# Patient Record
Sex: MALE | Race: WHITE | NOT HISPANIC OR LATINO | URBAN - METROPOLITAN AREA
[De-identification: names, ages, dates, MRNs, and addresses within clinical notes are randomized per-mention and may not be internally consistent; named-entity substitution may affect disease eponyms.]

---

## 2020-08-26 ENCOUNTER — TELEPHONE ENCOUNTER (OUTPATIENT)
Dept: URBAN - METROPOLITAN AREA CLINIC 40 | Facility: CLINIC | Age: 85
End: 2020-08-26

## 2020-08-26 RX ORDER — PANTOPRAZOLE SODIUM 40 MG/1
TAKE 1 TABLET BY ORAL ROUTE DAILY FOR 90 DAYS TABLET, DELAYED RELEASE ORAL 1
Qty: 90 | Refills: 3 | Status: ACTIVE | COMMUNITY
Start: 2020-05-08 | End: 2021-05-03

## 2020-08-26 RX ORDER — SUCRALFATE 1 G/1
1 TABLET ON AN EMPTY STOMACH TABLET ORAL TWICE A DAY
Qty: 60 TABLET | Refills: 2 | OUTPATIENT
Start: 2020-08-27 | End: 2020-11-24

## 2020-08-26 RX ORDER — SUCRALFATE 1 G/1
TAKE 1 TABLET BY ORAL ROUTE 2 TIMES A DAY FOR 90 DAYS TABLET ORAL 2
Qty: 180 | Refills: 1 | Status: ACTIVE | COMMUNITY
Start: 2020-02-28 | End: 2020-08-26

## 2020-11-22 ENCOUNTER — ERX REFILL RESPONSE (OUTPATIENT)
Dept: URBAN - METROPOLITAN AREA CLINIC 40 | Facility: CLINIC | Age: 85
End: 2020-11-22

## 2020-11-22 RX ORDER — SUCRALFATE 1 G/1
1 TABLET ON AN EMPTY STOMACH TABLET ORAL TWICE A DAY
Qty: 60 | Refills: 0

## 2020-12-24 ENCOUNTER — ERX REFILL RESPONSE (OUTPATIENT)
Dept: URBAN - METROPOLITAN AREA CLINIC 40 | Facility: CLINIC | Age: 85
End: 2020-12-24

## 2020-12-24 RX ORDER — SUCRALFATE 1 G/1
1 TABLET ON AN EMPTY STOMACH TABLET ORAL TWICE A DAY
Qty: 60 | Refills: 0

## 2021-01-28 ENCOUNTER — TELEPHONE ENCOUNTER (OUTPATIENT)
Dept: URBAN - METROPOLITAN AREA CLINIC 40 | Facility: CLINIC | Age: 86
End: 2021-01-28

## 2021-01-28 RX ORDER — SUCRALFATE 1 G/1
1 TABLET ON AN EMPTY STOMACH TABLET ORAL TWICE A DAY
Qty: 60 TABLET | Refills: 3

## 2021-02-02 ENCOUNTER — TELEPHONE ENCOUNTER (OUTPATIENT)
Dept: URBAN - METROPOLITAN AREA CLINIC 40 | Facility: CLINIC | Age: 86
End: 2021-02-02

## 2021-05-18 ENCOUNTER — ERX REFILL RESPONSE (OUTPATIENT)
Dept: URBAN - METROPOLITAN AREA CLINIC 13 | Facility: CLINIC | Age: 86
End: 2021-05-18

## 2021-05-18 RX ORDER — SUCRALFATE 1 G/1
TAKE 1 TABLET BY MOUTH TWICE DAILY ON AN EMPTY STOMACH TABLET ORAL
Qty: 60 | Refills: 0

## 2021-05-26 ENCOUNTER — TELEPHONE ENCOUNTER (OUTPATIENT)
Dept: URBAN - METROPOLITAN AREA CLINIC 40 | Facility: CLINIC | Age: 86
End: 2021-05-26

## 2021-05-26 RX ORDER — PANTOPRAZOLE SODIUM 40 MG/1
TAKE 1 TABLET BY ORAL ROUTE DAILY FOR 90 DAYS TABLET, DELAYED RELEASE ORAL 1
Qty: 90 | Refills: 0
Start: 2020-05-08

## 2021-06-15 ENCOUNTER — OFFICE VISIT (OUTPATIENT)
Dept: URBAN - METROPOLITAN AREA CLINIC 40 | Facility: CLINIC | Age: 86
End: 2021-06-15

## 2021-06-19 ENCOUNTER — ERX REFILL RESPONSE (OUTPATIENT)
Dept: URBAN - METROPOLITAN AREA CLINIC 13 | Facility: CLINIC | Age: 86
End: 2021-06-19

## 2021-06-19 RX ORDER — SUCRALFATE 1 G/1
TAKE 1 TABLET BY MOUTH TWICE DAILY ON AN EMPTY STOMACH TABLET ORAL
Qty: 60 | Refills: 0

## 2021-07-08 ENCOUNTER — OFFICE VISIT (OUTPATIENT)
Dept: URBAN - METROPOLITAN AREA CLINIC 40 | Facility: CLINIC | Age: 86
End: 2021-07-08
Payer: MEDICARE

## 2021-07-08 DIAGNOSIS — D50.0 IRON DEFICIENCY ANEMIA DUE TO CHRONIC BLOOD LOSS: ICD-10-CM

## 2021-07-08 DIAGNOSIS — K25.7 CHRONIC GASTRIC ULCER WITHOUT HEMORRHAGE AND WITHOUT PERFORATION: ICD-10-CM

## 2021-07-08 PROCEDURE — 99213 OFFICE O/P EST LOW 20 MIN: CPT | Performed by: INTERNAL MEDICINE

## 2021-07-08 RX ORDER — SUCRALFATE 1 G/1
TAKE 1 TABLET BY MOUTH TWICE DAILY ON AN EMPTY STOMACH TABLET ORAL
Qty: 60 | Refills: 0 | Status: ACTIVE | COMMUNITY

## 2021-07-08 RX ORDER — PANTOPRAZOLE SODIUM 40 MG/1
TAKE 1 TABLET BY ORAL ROUTE DAILY FOR 90 DAYS TABLET, DELAYED RELEASE ORAL 1
Qty: 90 | Refills: 0 | Status: ACTIVE | COMMUNITY
Start: 2020-05-08

## 2021-07-08 RX ORDER — SUCRALFATE 1 G/1
1 TABLET ON AN EMPTY STOMACH TABLET ORAL TWICE A DAY
Qty: 60 TABLET | Refills: 3 | Status: ACTIVE | COMMUNITY

## 2021-07-08 NOTE — HPI-TODAY'S VISIT:
The patient is a 87 year old male, who presents for follow up appointment. EGD was done approximately 1 year ago. The EGD findings revealed gastritis, esophagitis, a gastric ulcer, and schatzki's ring. T he patient pathology is consistent with gastritis and. CT shows pulmonary fibrosis/benign pancreatic cyst. feels better. gaining more weight. Had melena last year. was noted to have gastric ulcer/gastritis then. on carafate/PPI. no more bleeding. back on xarelto. has DM/HTN. no shortness of breath. has dementia

## 2021-07-08 NOTE — PHYSICAL EXAM CONSTITUTIONAL:
well developed, well nourished , in no acute distress , ambulating without difficulty ,decreased communication ability

## 2021-08-22 ENCOUNTER — ERX REFILL RESPONSE (OUTPATIENT)
Dept: URBAN - METROPOLITAN AREA CLINIC 40 | Facility: CLINIC | Age: 86
End: 2021-08-22

## 2021-08-22 RX ORDER — PANTOPRAZOLE SODIUM 40 MG/1
TAKE 1 TABLET BY ORAL ROUTE DAILY FOR 90 DAYS TABLET, DELAYED RELEASE ORAL 1
Qty: 90 | Refills: 0 | OUTPATIENT

## 2021-08-22 RX ORDER — PANTOPRAZOLE SODIUM 40 MG/1
TAKE 1 TABLET BY MOUTH DAILY TABLET, DELAYED RELEASE ORAL
Qty: 90 TABLET | Refills: 1 | OUTPATIENT

## 2021-09-14 ENCOUNTER — ERX REFILL RESPONSE (OUTPATIENT)
Dept: URBAN - METROPOLITAN AREA CLINIC 40 | Facility: CLINIC | Age: 86
End: 2021-09-14

## 2021-09-14 RX ORDER — PANTOPRAZOLE SODIUM 40 MG/1
TAKE 1 TABLET BY MOUTH DAILY TABLET, DELAYED RELEASE ORAL
Qty: 90 TABLET | Refills: 1 | OUTPATIENT

## 2021-12-23 ENCOUNTER — ERX REFILL RESPONSE (OUTPATIENT)
Dept: URBAN - METROPOLITAN AREA CLINIC 40 | Facility: CLINIC | Age: 86
End: 2021-12-23

## 2021-12-23 RX ORDER — PANTOPRAZOLE SODIUM 40 MG/1
TAKE 1 TABLET BY MOUTH DAILY TABLET, DELAYED RELEASE ORAL
Qty: 90 TABLET | Refills: 1 | OUTPATIENT

## 2022-06-27 ENCOUNTER — ERX REFILL RESPONSE (OUTPATIENT)
Dept: URBAN - METROPOLITAN AREA CLINIC 40 | Facility: CLINIC | Age: 87
End: 2022-06-27

## 2022-06-27 RX ORDER — PANTOPRAZOLE SODIUM 40 MG/1
TAKE 1 TABLET BY MOUTH DAILY TABLET, DELAYED RELEASE ORAL
Qty: 90 TABLET | Refills: 0 | OUTPATIENT

## 2022-06-27 RX ORDER — PANTOPRAZOLE SODIUM 40 MG/1
TAKE 1 TABLET BY MOUTH DAILY TABLET, DELAYED RELEASE ORAL
Qty: 90 TABLET | Refills: 1 | OUTPATIENT

## 2022-06-28 ENCOUNTER — TELEPHONE ENCOUNTER (OUTPATIENT)
Dept: URBAN - METROPOLITAN AREA CLINIC 96 | Facility: CLINIC | Age: 87
End: 2022-06-28

## 2022-06-28 RX ORDER — PANTOPRAZOLE SODIUM 40 MG/1
TAKE 1 TABLET BY MOUTH DAILY TABLET, DELAYED RELEASE ORAL
Qty: 90 TABLET | Refills: 0

## 2022-09-23 ENCOUNTER — OFFICE VISIT (OUTPATIENT)
Dept: URBAN - METROPOLITAN AREA CLINIC 40 | Facility: CLINIC | Age: 87
End: 2022-09-23
Payer: MEDICARE

## 2022-09-23 VITALS
HEART RATE: 64 BPM | OXYGEN SATURATION: 99 % | DIASTOLIC BLOOD PRESSURE: 80 MMHG | TEMPERATURE: 99.7 F | SYSTOLIC BLOOD PRESSURE: 135 MMHG | BODY MASS INDEX: 21.9 KG/M2 | WEIGHT: 153 LBS | HEIGHT: 70 IN

## 2022-09-23 DIAGNOSIS — K25.7 CHRONIC GASTRIC ULCER WITHOUT HEMORRHAGE AND WITHOUT PERFORATION: ICD-10-CM

## 2022-09-23 DIAGNOSIS — D50.0 IRON DEFICIENCY ANEMIA DUE TO CHRONIC BLOOD LOSS: ICD-10-CM

## 2022-09-23 PROBLEM — 76796008: Status: ACTIVE | Noted: 2021-07-07

## 2022-09-23 PROBLEM — 724556004: Status: ACTIVE | Noted: 2021-07-07

## 2022-09-23 PROCEDURE — 99213 OFFICE O/P EST LOW 20 MIN: CPT | Performed by: INTERNAL MEDICINE

## 2022-09-23 RX ORDER — SUCRALFATE 1 G/1
TAKE 1 TABLET BY MOUTH TWICE DAILY ON AN EMPTY STOMACH TABLET ORAL
Qty: 60 | Refills: 0 | Status: ON HOLD | COMMUNITY

## 2022-09-23 RX ORDER — SUCRALFATE 1 G/1
1 TABLET ON AN EMPTY STOMACH TABLET ORAL TWICE A DAY
Qty: 60 TABLET | Refills: 3 | Status: ON HOLD | COMMUNITY

## 2022-09-23 RX ORDER — PANTOPRAZOLE SODIUM 40 MG/1
TAKE 1 TABLET BY MOUTH DAILY TABLET, DELAYED RELEASE ORAL
Qty: 90 TABLET | Refills: 3

## 2022-09-23 RX ORDER — PANTOPRAZOLE SODIUM 40 MG/1
TAKE 1 TABLET BY MOUTH DAILY TABLET, DELAYED RELEASE ORAL
Qty: 90 TABLET | Refills: 0 | Status: ACTIVE | COMMUNITY

## 2022-09-23 RX ORDER — SUCRALFATE 1 G/1
TAKE 1 TABLET BY MOUTH TWICE DAILY ON AN EMPTY STOMACH TABLET ORAL
OUTPATIENT

## 2022-09-23 NOTE — HPI-TODAY'S VISIT:
The patient is a 88 year old male, who presents for follow up appointment. EGD was done approximately 2 year ago. The EGD findings revealed gastritis, esophagitis, a gastric ulcer, and schatzki's ring. T he patient pathology is consistent with gastritis. He feels well CT shows pulmonary fibrosis/benign pancreatic cyst. Feels better. Gaining more weight. no GI bleeding noted. was noted to have gastric ulcer/gastritis in the past. on carafate/PPI. No more bleeding. back on xarelto. has DM/HTN. no shortness of breath. has dementia. He fell down few months ago and hit his head. Recovered from it.

## 2023-09-20 ENCOUNTER — DASHBOARD ENCOUNTERS (OUTPATIENT)
Age: 88
End: 2023-09-20

## 2023-09-22 ENCOUNTER — OFFICE VISIT (OUTPATIENT)
Dept: URBAN - METROPOLITAN AREA CLINIC 40 | Facility: CLINIC | Age: 88
End: 2023-09-22

## 2023-09-22 RX ORDER — SUCRALFATE 1 G/1
1 TABLET ON AN EMPTY STOMACH TABLET ORAL TWICE A DAY
Qty: 60 TABLET | Refills: 3 | COMMUNITY

## 2023-09-22 RX ORDER — PANTOPRAZOLE SODIUM 40 MG/1
TAKE 1 TABLET BY MOUTH DAILY TABLET, DELAYED RELEASE ORAL
Qty: 90 TABLET | Refills: 3

## 2023-09-22 RX ORDER — PANTOPRAZOLE SODIUM 40 MG/1
TAKE 1 TABLET BY MOUTH DAILY TABLET, DELAYED RELEASE ORAL
Qty: 90 TABLET | Refills: 3 | Status: ACTIVE | COMMUNITY